# Patient Record
Sex: MALE | Race: WHITE | NOT HISPANIC OR LATINO | Employment: FULL TIME | URBAN - METROPOLITAN AREA
[De-identification: names, ages, dates, MRNs, and addresses within clinical notes are randomized per-mention and may not be internally consistent; named-entity substitution may affect disease eponyms.]

---

## 2018-01-18 NOTE — MISCELLANEOUS
Message  GI Reminder Recall Marton Halsted:   Date: 12/09/2016   Dear Nurys Beltre:     Review of our records shows you are due for the following: Follow Up Visit  Please call the following office to schedule your appointment:   [address] [place] [address], [address], [address] [place] [address]  [phone]  We look forward to hearing from you!      Sincerely,     Marina Villalta MA      Signatures   Electronically signed by : Marina Villalta, ; Dec  9 2016  9:26AM EST                       (Author)

## 2018-07-11 ENCOUNTER — TRANSCRIBE ORDERS (OUTPATIENT)
Dept: ADMINISTRATIVE | Facility: HOSPITAL | Age: 43
End: 2018-07-11

## 2018-07-11 ENCOUNTER — APPOINTMENT (OUTPATIENT)
Dept: LAB | Facility: MEDICAL CENTER | Age: 43
End: 2018-07-11
Payer: COMMERCIAL

## 2018-07-11 DIAGNOSIS — R53.83 OTHER FATIGUE: ICD-10-CM

## 2018-07-11 DIAGNOSIS — E55.9 VITAMIN D DEFICIENCY: ICD-10-CM

## 2018-07-11 DIAGNOSIS — I10 ESSENTIAL HYPERTENSION, BENIGN: ICD-10-CM

## 2018-07-11 DIAGNOSIS — I10 ESSENTIAL HYPERTENSION, BENIGN: Primary | ICD-10-CM

## 2018-07-11 DIAGNOSIS — Z79.899 ENCOUNTER FOR LONG-TERM (CURRENT) USE OF MEDICATIONS: ICD-10-CM

## 2018-07-11 DIAGNOSIS — E53.8 DEFICIENCY OF OTHER SPECIFIED B GROUP VITAMINS: ICD-10-CM

## 2018-07-11 LAB
25(OH)D3 SERPL-MCNC: 29.2 NG/ML (ref 30–100)
ALBUMIN SERPL BCP-MCNC: 4 G/DL (ref 3.5–5)
ALP SERPL-CCNC: 76 U/L (ref 46–116)
ALT SERPL W P-5'-P-CCNC: 45 U/L (ref 12–78)
ANION GAP SERPL CALCULATED.3IONS-SCNC: 5 MMOL/L (ref 4–13)
AST SERPL W P-5'-P-CCNC: 26 U/L (ref 5–45)
BACTERIA UR QL AUTO: ABNORMAL /HPF
BILIRUB SERPL-MCNC: 1.24 MG/DL (ref 0.2–1)
BILIRUB UR QL STRIP: NEGATIVE
BUN SERPL-MCNC: 21 MG/DL (ref 5–25)
CALCIUM SERPL-MCNC: 8.9 MG/DL (ref 8.3–10.1)
CHLORIDE SERPL-SCNC: 105 MMOL/L (ref 100–108)
CHOLEST SERPL-MCNC: 203 MG/DL (ref 50–200)
CLARITY UR: CLEAR
CO2 SERPL-SCNC: 27 MMOL/L (ref 21–32)
COLOR UR: ABNORMAL
CREAT SERPL-MCNC: 1.05 MG/DL (ref 0.6–1.3)
ERYTHROCYTE [DISTWIDTH] IN BLOOD BY AUTOMATED COUNT: 12.5 % (ref 11.6–15.1)
EST. AVERAGE GLUCOSE BLD GHB EST-MCNC: 108 MG/DL
GFR SERPL CREATININE-BSD FRML MDRD: 88 ML/MIN/1.73SQ M
GLUCOSE P FAST SERPL-MCNC: 84 MG/DL (ref 65–99)
GLUCOSE UR STRIP-MCNC: NEGATIVE MG/DL
HBA1C MFR BLD: 5.4 % (ref 4.2–6.3)
HCT VFR BLD AUTO: 48.2 % (ref 36.5–49.3)
HDLC SERPL-MCNC: 39 MG/DL (ref 40–60)
HGB BLD-MCNC: 15.7 G/DL (ref 12–17)
HGB UR QL STRIP.AUTO: NEGATIVE
HYALINE CASTS #/AREA URNS LPF: ABNORMAL /LPF
KETONES UR STRIP-MCNC: NEGATIVE MG/DL
LDLC SERPL CALC-MCNC: 138 MG/DL (ref 0–100)
LEUKOCYTE ESTERASE UR QL STRIP: NEGATIVE
MCH RBC QN AUTO: 29.2 PG (ref 26.8–34.3)
MCHC RBC AUTO-ENTMCNC: 32.6 G/DL (ref 31.4–37.4)
MCV RBC AUTO: 90 FL (ref 82–98)
NITRITE UR QL STRIP: NEGATIVE
NON-SQ EPI CELLS URNS QL MICRO: ABNORMAL /HPF
NONHDLC SERPL-MCNC: 164 MG/DL
PH UR STRIP.AUTO: 6 [PH] (ref 4.5–8)
PLATELET # BLD AUTO: 236 THOUSANDS/UL (ref 149–390)
PMV BLD AUTO: 10.4 FL (ref 8.9–12.7)
POTASSIUM SERPL-SCNC: 4.1 MMOL/L (ref 3.5–5.3)
PROT SERPL-MCNC: 7.6 G/DL (ref 6.4–8.2)
PROT UR STRIP-MCNC: ABNORMAL MG/DL
RBC # BLD AUTO: 5.37 MILLION/UL (ref 3.88–5.62)
RBC #/AREA URNS AUTO: ABNORMAL /HPF
SODIUM SERPL-SCNC: 137 MMOL/L (ref 136–145)
SP GR UR STRIP.AUTO: 1.03 (ref 1–1.03)
TRIGL SERPL-MCNC: 131 MG/DL
TSH SERPL DL<=0.05 MIU/L-ACNC: 1.12 UIU/ML (ref 0.36–3.74)
UROBILINOGEN UR QL STRIP.AUTO: 0.2 E.U./DL
VIT B12 SERPL-MCNC: 883 PG/ML (ref 100–900)
WBC # BLD AUTO: 6.68 THOUSAND/UL (ref 4.31–10.16)
WBC #/AREA URNS AUTO: ABNORMAL /HPF

## 2018-07-11 PROCEDURE — 36415 COLL VENOUS BLD VENIPUNCTURE: CPT | Performed by: FAMILY MEDICINE

## 2018-07-11 PROCEDURE — 80061 LIPID PANEL: CPT

## 2018-07-11 PROCEDURE — 84443 ASSAY THYROID STIM HORMONE: CPT

## 2018-07-11 PROCEDURE — 83036 HEMOGLOBIN GLYCOSYLATED A1C: CPT | Performed by: FAMILY MEDICINE

## 2018-07-11 PROCEDURE — 80053 COMPREHEN METABOLIC PANEL: CPT

## 2018-07-11 PROCEDURE — 82306 VITAMIN D 25 HYDROXY: CPT

## 2018-07-11 PROCEDURE — 84402 ASSAY OF FREE TESTOSTERONE: CPT

## 2018-07-11 PROCEDURE — 82607 VITAMIN B-12: CPT

## 2018-07-11 PROCEDURE — 84403 ASSAY OF TOTAL TESTOSTERONE: CPT

## 2018-07-11 PROCEDURE — 85027 COMPLETE CBC AUTOMATED: CPT

## 2018-07-11 PROCEDURE — 81001 URINALYSIS AUTO W/SCOPE: CPT | Performed by: FAMILY MEDICINE

## 2018-07-12 LAB
TESTOST FREE SERPL-MCNC: 11.8 PG/ML (ref 6.8–21.5)
TESTOST SERPL-MCNC: 611 NG/DL (ref 264–916)

## 2018-08-20 ENCOUNTER — TELEPHONE (OUTPATIENT)
Dept: CARDIOLOGY CLINIC | Facility: CLINIC | Age: 43
End: 2018-08-20

## 2019-07-30 ENCOUNTER — TELEPHONE (OUTPATIENT)
Dept: CARDIOLOGY CLINIC | Facility: MEDICAL CENTER | Age: 44
End: 2019-07-30

## 2019-07-30 NOTE — TELEPHONE ENCOUNTER
Called and left messages about medical records and prior cardiac history  Left marisela and agustina romero phone numbers

## 2019-09-03 ENCOUNTER — OFFICE VISIT (OUTPATIENT)
Dept: CARDIOLOGY CLINIC | Facility: MEDICAL CENTER | Age: 44
End: 2019-09-03
Payer: COMMERCIAL

## 2019-09-03 VITALS
DIASTOLIC BLOOD PRESSURE: 70 MMHG | HEART RATE: 72 BPM | BODY MASS INDEX: 29.02 KG/M2 | HEIGHT: 70 IN | OXYGEN SATURATION: 98 % | SYSTOLIC BLOOD PRESSURE: 122 MMHG | WEIGHT: 202.7 LBS

## 2019-09-03 DIAGNOSIS — R00.2 PALPITATIONS: ICD-10-CM

## 2019-09-03 DIAGNOSIS — I10 ESSENTIAL HYPERTENSION: ICD-10-CM

## 2019-09-03 DIAGNOSIS — I10 HYPERTENSION, UNSPECIFIED TYPE: Primary | ICD-10-CM

## 2019-09-03 DIAGNOSIS — E78.5 DYSLIPIDEMIA: ICD-10-CM

## 2019-09-03 PROCEDURE — 99204 OFFICE O/P NEW MOD 45 MIN: CPT | Performed by: INTERNAL MEDICINE

## 2019-09-03 PROCEDURE — 93000 ELECTROCARDIOGRAM COMPLETE: CPT | Performed by: INTERNAL MEDICINE

## 2019-09-03 PROCEDURE — 3074F SYST BP LT 130 MM HG: CPT | Performed by: INTERNAL MEDICINE

## 2019-09-03 PROCEDURE — 3078F DIAST BP <80 MM HG: CPT | Performed by: INTERNAL MEDICINE

## 2019-09-03 RX ORDER — LORATADINE 10 MG/1
10 TABLET ORAL DAILY
Refills: 0 | COMMUNITY
Start: 2019-06-02 | End: 2019-09-03 | Stop reason: ALTCHOICE

## 2019-09-03 RX ORDER — AZILSARTAN KAMEDOXOMIL 40 MG/1
40 TABLET ORAL DAILY
Refills: 1 | COMMUNITY
Start: 2019-06-17 | End: 2020-05-29

## 2019-09-03 NOTE — PROGRESS NOTES
Cardiology   Sonja Baltazar 45 y o  male MRN: [id number]        Impression:  1  Hypertension - essential   Well controlled with current medications  2  Dyslipidemia - elevated LDL  Recommendations:  1  Continue Edarbi  2  No indication for stress test at this time  3  Check fasting lipid profile  4  Recommend increasing exercise and watching diet  5  Follow up in 6 months  HPI: Sonja Baltazar is a 45y o  year old male with a history of hypertension presents for evaluation  He was diagnosed with hypertension for past 3-4 years  Without medication SBP in 180's  Prior to that, was sedentary and did not watch diet  Family history of hypertension  Feels poorly when doesn't take medications  No chest pain or shortness of breath  Review of Systems   Constitutional: Negative  HENT: Negative  Eyes: Negative  Respiratory: Negative for chest tightness and shortness of breath  Cardiovascular: Negative for chest pain, palpitations and leg swelling  Gastrointestinal: Negative  Endocrine: Negative  Genitourinary: Negative  Musculoskeletal: Negative  Skin: Negative  Allergic/Immunologic: Negative  Neurological: Negative  Hematological: Negative  Psychiatric/Behavioral: Negative  All other systems reviewed and are negative          Past Medical History:   Diagnosis Date    Hypertension      Past Surgical History:   Procedure Laterality Date    INGUINAL HERNIA REPAIR      MOUTH SURGERY      VASECTOMY       Social History     Substance and Sexual Activity   Alcohol Use Yes    Comment: social, very rare     Social History     Substance and Sexual Activity   Drug Use No     Social History     Tobacco Use   Smoking Status Never Smoker   Smokeless Tobacco Never Used     Family History   Problem Relation Age of Onset    Hypertension Mother     Arrhythmia Mother     Atrial fibrillation Mother     Hypertension Father     Stroke Father     Hyperlipidemia Neg Hx  Heart failure Neg Hx     Heart disease Neg Hx     Heart attack Neg Hx     Fainting Neg Hx     Asthma Neg Hx     Clotting disorder Neg Hx     Anemia Neg Hx        Allergies: Allergies   Allergen Reactions    Citalopram Chest Pain, Dizziness and Other (See Comments)    Penicillins        Medications:     Current Outpatient Medications:     EDARBI 40 MG tablet, Take 40 mg by mouth daily, Disp: , Rfl: 1      Wt Readings from Last 3 Encounters:   09/03/19 91 9 kg (202 lb 11 2 oz)   10/20/16 82 2 kg (181 lb 3 oz)   08/13/16 81 6 kg (180 lb)     Temp Readings from Last 3 Encounters:   10/20/16 98 1 °F (36 7 °C)   08/13/16 98 2 °F (36 8 °C) (Oral)   09/17/14 97 6 °F (36 4 °C)     BP Readings from Last 3 Encounters:   09/03/19 122/70   10/20/16 148/94   08/13/16 127/88     Pulse Readings from Last 3 Encounters:   09/03/19 72   10/20/16 70   08/13/16 58         Physical Exam   Constitutional: He is oriented to person, place, and time  He appears well-developed  HENT:   Head: Atraumatic  Eyes: EOM are normal    Neck: Normal range of motion  Cardiovascular: Normal rate, regular rhythm and normal heart sounds  Exam reveals no gallop and no friction rub  No murmur heard  Pulmonary/Chest: Effort normal and breath sounds normal  No stridor  No respiratory distress  He has no wheezes  Abdominal: Soft  Musculoskeletal: Normal range of motion  Neurological: He is alert and oriented to person, place, and time  Skin: Skin is warm and dry  Psychiatric: He has a normal mood and affect           Laboratory Studies:  CMP:  Lab Results   Component Value Date     10/11/2014    K 4 1 07/11/2018     07/11/2018    CO2 27 07/11/2018    ANIONGAP 4 10/11/2014    BUN 21 07/11/2018    CREATININE 1 05 07/11/2018    GLUCOSE 99 10/11/2014    AST 26 07/11/2018    ALT 45 07/11/2018    BILITOT 0 7 10/11/2014    EGFR 88 07/11/2018       Lipid Profile:   No results found for: CHOL  Lab Results   Component Value Date    HDL 39 (L) 07/11/2018     Lab Results   Component Value Date    LDLCALC 138 (H) 07/11/2018     Lab Results   Component Value Date    TRIG 131 07/11/2018       Cardiac testing:   EKG reviewed personally: DEBO Reaves

## 2019-09-03 NOTE — PATIENT INSTRUCTIONS
Recommendations:  1  Continue Edarbi  2  No indication for stress test at this time  3  Check fasting lipid profile  4  Recommend increasing exercise and watching diet  5  Follow up in 6 months

## 2020-05-29 DIAGNOSIS — I10 ESSENTIAL HYPERTENSION: Primary | ICD-10-CM

## 2020-05-29 RX ORDER — AZILSARTAN KAMEDOXOMIL 40 MG/1
TABLET ORAL
Qty: 90 TABLET | Refills: 1 | Status: SHIPPED | OUTPATIENT
Start: 2020-05-29 | End: 2020-08-31

## 2020-07-24 ENCOUNTER — TELEPHONE (OUTPATIENT)
Dept: FAMILY MEDICINE CLINIC | Facility: CLINIC | Age: 45
End: 2020-07-24

## 2020-07-24 NOTE — TELEPHONE ENCOUNTER
COVID Pre-Visit Screening     1  Is this a family member screening? No  2  Have you traveled outside of your state in the past 2 weeks? No  3  Do you presently have a fever or flu-like symptoms? No  4  Do you have symptoms of an upper respiratory infection like runny nose, sore throat, or cough? No  5  Are you suffering from new headache that you have not had in the past?  No  6  Do you have/have you experienced any new shortness of breath recently? No  7  Do you have any new diarrhea, nausea or vomiting? No  8  Have you been in contact with anyone who has been sick or diagnosed with COVID-19? No  9  Do you have any new loss of taste or smell? No  10  Are you able to wear a mask without a valve for the entire visit? Yes  Called pt to confirm appt and made aware of check in process    [facility]

## 2020-07-24 NOTE — PROGRESS NOTES
BMI Counseling: Body mass index is 27 26 kg/m²  The BMI is above normal  Nutrition recommendations include decreasing portion sizes, encouraging healthy choices of fruits and vegetables, decreasing fast food intake, consuming healthier snacks, limiting drinks that contain sugar, moderation in carbohydrate intake, increasing intake of lean protein, reducing intake of saturated and trans fat and reducing intake of cholesterol  Exercise recommendations include vigorous physical activity 75 minutes/week, exercising 3-5 times per week, obtaining a gym membership and strength training exercises  No pharmacotherapy was ordered  BMI Counseling: Body mass index is 27 26 kg/m²  Follow-up plan was not completed due to patient being in urgent or emergent medical situation  Assessment/Plan:         Problem List Items Addressed This Visit        Other    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    Relevant Orders    POCT urine dip (Completed)            Subjective:      Patient ID: Le Diaz is a 39 y o  male  Patient is a 53-year-old 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       The following portions of the patient's history were reviewed and updated as appropriate:   He has a past medical history of Hypertension  ,  does not have any pertinent problems on file  ,   has a past surgical history that includes Vasectomy; Mouth surgery; and Inguinal hernia repair (1995)  ,  family history includes Arrhythmia in his mother; Atrial fibrillation in his mother; COPD in his mother; Heart murmur in his mother; Hypertension in his father and mother; Stroke in his father  ,   reports that he has never smoked  He has never used smokeless tobacco  He reports that he drinks alcohol  He reports that he does not use drugs  ,  is allergic to citalopram and penicillins     Current Outpatient Medications   Medication Sig Dispense Refill    EDARBI 40 MG tablet TAKE 1 TABLET EVERY DAY 90 tablet 1     No current facility-administered medications for this visit  Review of Systems   Constitutional: Negative for appetite change, chills, fatigue and fever  HENT: Negative for congestion, ear pain, postnasal drip, rhinorrhea, sinus pain and sore throat  Eyes: Negative for pain, redness and visual disturbance  Respiratory: Negative for cough and shortness of breath  Cardiovascular: Negative for chest pain  Gastrointestinal: Negative for abdominal pain, diarrhea, nausea and vomiting  Genitourinary: Negative for dysuria and hematuria  Musculoskeletal: Negative for arthralgias  Skin: Negative  Allergic/Immunologic: Negative for environmental allergies and food allergies  Neurological: Positive for headaches  Negative for dizziness, tremors, syncope, weakness, light-headedness and numbness  Objective:  Vitals:    07/27/20 1519   BP: 146/88   BP Location: Left arm   Patient Position: Sitting   Cuff Size: Standard   Pulse: 64   Resp: 12   Temp: 98 4 °F (36 9 °C)   TempSrc: Temporal   Weight: 86 2 kg (190 lb)   Height: 5' 10" (1 778 m)     Body mass index is 27 26 kg/m²  Physical Exam   Constitutional: He is oriented to person, place, and time  He appears well-developed and well-nourished  HENT:   Head: Normocephalic and atraumatic  Eyes: Pupils are equal, round, and reactive to light  Neck: Normal range of motion  Cardiovascular: Normal rate and regular rhythm  No murmur heard  Pulmonary/Chest: Effort normal and breath sounds normal    Abdominal: Soft  Bowel sounds are normal    Musculoskeletal: Normal range of motion  Neurological: He is alert and oriented to person, place, and time  Skin: Skin is warm  Psychiatric: He has a normal mood and affect  Nursing note and vitals reviewed

## 2020-07-24 NOTE — PATIENT INSTRUCTIONS
Medical Examiner's Certificate    I certify that I have examined Vince Sloan  In accordance with the [employer] (54   60) and with knowledge of the driving duties, I find this person is qualified; and, if applicable, only when:      Qualified by operation of 49  64  The information I have provided regarding this physical examination is true and complete   A complete examination form with any attachment embodies my findings completely and correctly, and is on file in my office       _________________________________________  RASHID Zepeda  7/27/2020    __________________________________________ _______________________ _____   Signature of  s License No  State       Address of   [place] [address]  [place] [address]    Medical Certificate Expiration Date  7/27/2021

## 2020-07-27 ENCOUNTER — OFFICE VISIT (OUTPATIENT)
Dept: FAMILY MEDICINE CLINIC | Facility: CLINIC | Age: 45
End: 2020-07-27

## 2020-07-27 VITALS
RESPIRATION RATE: 12 BRPM | BODY MASS INDEX: 27.2 KG/M2 | DIASTOLIC BLOOD PRESSURE: 90 MMHG | SYSTOLIC BLOOD PRESSURE: 140 MMHG | HEIGHT: 70 IN | HEART RATE: 64 BPM | WEIGHT: 190 LBS | TEMPERATURE: 98.4 F

## 2020-07-27 DIAGNOSIS — Z02.4 ENCOUNTER FOR CDL (COMMERCIAL DRIVING LICENSE) EXAM: Primary | ICD-10-CM

## 2020-07-27 LAB
SL AMB  POCT GLUCOSE, UA: NEGATIVE
SL AMB LEUKOCYTE ESTERASE,UA: NORMAL
SL AMB POCT BILIRUBIN,UA: NEGATIVE
SL AMB POCT BLOOD,UA: NEGATIVE
SL AMB POCT CLARITY,UA: NORMAL
SL AMB POCT COLOR,UA: YELLOW
SL AMB POCT KETONES,UA: NEGATIVE
SL AMB POCT NITRITE,UA: NEGATIVE
SL AMB POCT PH,UA: 6
SL AMB POCT SPECIFIC GRAVITY,UA: 1.01
SL AMB POCT URINE PROTEIN: NORMAL
SL AMB POCT UROBILINOGEN: NEGATIVE

## 2020-07-27 PROCEDURE — 81002 URINALYSIS NONAUTO W/O SCOPE: CPT | Performed by: NURSE PRACTITIONER

## 2020-07-27 PROCEDURE — 99499 UNLISTED E&M SERVICE: CPT | Performed by: NURSE PRACTITIONER

## 2020-07-27 PROCEDURE — 3080F DIAST BP >= 90 MM HG: CPT | Performed by: NURSE PRACTITIONER

## 2020-07-27 PROCEDURE — 3077F SYST BP >= 140 MM HG: CPT | Performed by: NURSE PRACTITIONER

## 2020-07-27 PROCEDURE — 3008F BODY MASS INDEX DOCD: CPT | Performed by: NURSE PRACTITIONER

## 2020-08-30 DIAGNOSIS — I10 ESSENTIAL HYPERTENSION: ICD-10-CM

## 2020-08-31 RX ORDER — AZILSARTAN KAMEDOXOMIL 40 MG/1
TABLET ORAL
Qty: 90 TABLET | Refills: 1 | Status: SHIPPED | OUTPATIENT
Start: 2020-08-31

## 2024-02-21 PROBLEM — Z02.4 ENCOUNTER FOR CDL (COMMERCIAL DRIVING LICENSE) EXAM: Status: RESOLVED | Noted: 2020-07-27 | Resolved: 2024-02-21

## 2024-05-30 ENCOUNTER — TELEPHONE (OUTPATIENT)
Dept: GASTROENTEROLOGY | Facility: CLINIC | Age: 49
End: 2024-05-30

## 2024-05-30 NOTE — TELEPHONE ENCOUNTER
Left message for patient to contact the office with insurance information to update chart or will be a $30 deposit on day of appointment

## 2024-05-31 ENCOUNTER — OFFICE VISIT (OUTPATIENT)
Dept: GASTROENTEROLOGY | Facility: CLINIC | Age: 49
End: 2024-05-31
Payer: COMMERCIAL

## 2024-05-31 VITALS
WEIGHT: 166 LBS | HEIGHT: 70 IN | SYSTOLIC BLOOD PRESSURE: 152 MMHG | HEART RATE: 72 BPM | TEMPERATURE: 97.5 F | OXYGEN SATURATION: 99 % | DIASTOLIC BLOOD PRESSURE: 89 MMHG | BODY MASS INDEX: 23.77 KG/M2

## 2024-05-31 DIAGNOSIS — R10.13 EPIGASTRIC PAIN: ICD-10-CM

## 2024-05-31 DIAGNOSIS — K64.1 GRADE II HEMORRHOIDS: ICD-10-CM

## 2024-05-31 DIAGNOSIS — R14.2 BURPING: ICD-10-CM

## 2024-05-31 DIAGNOSIS — K62.5 RECTAL BLEEDING: Primary | ICD-10-CM

## 2024-05-31 PROCEDURE — 99214 OFFICE O/P EST MOD 30 MIN: CPT | Performed by: INTERNAL MEDICINE

## 2024-05-31 RX ORDER — HYDROCORTISONE ACETATE 25 MG/1
25 SUPPOSITORY RECTAL
Qty: 14 SUPPOSITORY | Refills: 2 | Status: SHIPPED | OUTPATIENT
Start: 2024-05-31

## 2024-05-31 NOTE — PROGRESS NOTES
[facility] - Outpatient Consultation  El Marti 48 y.o. male MRN: [id number]  Encounter: [id number]          ASSESSMENT AND PLAN:       Rectal bleeding  - hydrocortisone (ANUSOL-HC) 25 mg suppository; Insert 1 suppository (25 mg total) into the rectum daily at bedtime  Dispense: 14 suppository; Refill: 2  - Colonoscopy; Future    2. Grade II hemorrhoids  - hydrocortisone (ANUSOL-HC) 25 mg suppository; Insert 1 suppository (25 mg total) into the rectum daily at bedtime  Dispense: 14 suppository; Refill: 2    3. Epigastric pain  - EGD; Future    4. Burping  - EGD; Future    5.  Gastroesophageal reflux disease    ______________________________________________________________________    HPI: El is a 48-year-old male who comes the office today for evaluation of multiple GI complaints.  He has been experiencing intermittent rectal bleeding intermittently over the past couple of months.  He has a longstanding history of having hemorrhoids over the past 5 to 7 years.  He states that the bleeding is of bright red rectal bleeding.  His last colonoscopy was performed 6 years ago and at that time he was reported to have hemorrhoids.  There is no family history for colon cancer for colon polyp.  Patient denies any nausea, vomiting, constipation but he does admit to diarrhea on occasion.  He states he has a diagnosis of irritable bowel syndrome.  He denies any gasping, bloating but he does admit to burping frequently.  He has a longstanding history of intermittent gastroesophageal reflux disease.  In addition to experiencing indigestion he has an upper abdominal pressure sensation.  There is no family history for colon cancer for colon polyp.  He is experiencing abdominal pains on occasion as well as burping, heartburn, diarrhea which is frequent in the morning.  She is currently not on any medications.  He has a prior history of irritable bowel syndrome.  He admits to excessive burping  but denies any regurgitation.  He is experiencing epigastric discomfort as well as indigestion on a fairly frequent basis.      REVIEW OF SYSTEMS:    CONSTITUTIONAL: Denies any fever, chills, rigors, and weight loss.  HEENT: No earache or tinnitus. Denies hearing loss or visual disturbances.  CARDIOVASCULAR: No chest pain or palpitations.   RESPIRATORY: Denies any cough, hemoptysis, shortness of breath or dyspnea on exertion.  GASTROINTESTINAL: As noted in the History of Present Illness.   GENITOURINARY: No problems with urination. Denies any hematuria or dysuria.  NEUROLOGIC: No dizziness or vertigo, denies headaches.   MUSCULOSKELETAL: Denies any muscle or joint pain.   SKIN: Denies skin rashes or itching.   ENDOCRINE: Denies excessive thirst. Denies intolerance to heat or cold.  PSYCHOSOCIAL: Denies depression or anxiety. Denies any recent memory loss.       Historical Information   Past Medical History:   Diagnosis Date    Hypertension      Past Surgical History:   Procedure Laterality Date    INGUINAL HERNIA REPAIR  1995    Prp I/corey init reduc >5 yr     MOUTH SURGERY      VASECTOMY       Social History   Social History     Substance and Sexual Activity   Alcohol Use Yes    Comment: social, very rare     Social History     Substance and Sexual Activity   Drug Use No     Social History     Tobacco Use   Smoking Status Never   Smokeless Tobacco Never     Family History   Problem Relation Age of Onset    Hypertension Mother     Arrhythmia Mother     Atrial fibrillation Mother     Heart murmur Mother     COPD Mother     Hypertension Father     Stroke Father     Hyperlipidemia Neg Hx     Heart failure Neg Hx     Heart disease Neg Hx     Heart attack Neg Hx     Fainting Neg Hx     Asthma Neg Hx     Clotting disorder Neg Hx     Anemia Neg Hx        Meds/Allergies       Current Outpatient Medications:     hydrocortisone (ANUSOL-HC) 25 mg suppository    Edarbi 40 MG tablet    Allergies   Allergen Reactions     "Citalopram Chest Pain, Dizziness and Other (See Comments)    Penicillins            Objective     Blood pressure 152/89, pulse 72, temperature 97.5 °F (36.4 °C), temperature source Tympanic, height 5' 10\" (1.778 m), weight 75.3 kg (166 lb), SpO2 99%. Body mass index is 23.82 kg/m².        PHYSICAL EXAM:      General Appearance:   Alert, cooperative, no distress   HEENT:   Normocephalic, atraumatic, anicteric.     Neck:  Supple, symmetrical, trachea midline   Lungs:   Clear to auscultation bilaterally; no rales, rhonchi or wheezing; respirations unlabored    Heart::   Regular rate and rhythm; no murmur, rub, or gallop.   Abdomen:   Soft, non-tender, non-distended; normal bowel sounds; no masses, no organomegaly    Genitalia:   Deferred    Rectal:   Deferred    Extremities:  No cyanosis, clubbing or edema    Pulses:  2+ and symmetric    Skin:  No jaundice, rashes, or lesions    Lymph nodes:  No palpable cervical lymphadenopathy        Lab Results:   No visits with results within 1 Day(s) from this visit.   Latest known visit with results is:   Office Visit on 07/27/2020   Component Date Value    LEUKOCYTE ESTERASE,UA 07/27/2020 trace     NITRITE,UA 07/27/2020 negative     SL AMB POCT UROBILINOGEN 07/27/2020 negative     POCT URINE PROTEIN 07/27/2020 trace      PH,UA 07/27/2020 6     BLOOD,UA 07/27/2020 negative     SPECIFIC GRAVITY,UA 07/27/2020 1.015     KETONES,UA 07/27/2020 negative     BILIRUBIN,UA 07/27/2020 negative     GLUCOSE, UA 07/27/2020 negative      COLOR,UA 07/27/2020 yellow     CLARITY,UA 07/27/2020 normal          Radiology Results:   No results found.  "

## 2024-05-31 NOTE — PATIENT INSTRUCTIONS
Scheduled date of EGD/colonoscopy (as of today):6/13/24  Physician performing EGD/colonoscopy:Stefania  Location of EGD/colonoscopy:[facility]  Desired bowel prep reviewed with patient:Margarita/Miralax  Instructions reviewed with patient by:Arjun trevizo   Clearances:   none

## 2024-06-11 DIAGNOSIS — K62.5 RECTAL BLEEDING: ICD-10-CM

## 2024-06-11 DIAGNOSIS — K64.1 GRADE II HEMORRHOIDS: ICD-10-CM

## 2024-06-11 RX ORDER — HYDROCORTISONE ACETATE 25 MG/1
25 SUPPOSITORY RECTAL
Qty: 14 SUPPOSITORY | Refills: 2 | Status: SHIPPED | OUTPATIENT
Start: 2024-06-11

## 2024-07-12 DIAGNOSIS — K64.1 GRADE II HEMORRHOIDS: ICD-10-CM

## 2024-07-12 DIAGNOSIS — K62.5 RECTAL BLEEDING: ICD-10-CM

## 2024-07-12 RX ORDER — HYDROCORTISONE ACETATE 25 MG/1
25 SUPPOSITORY RECTAL
Qty: 14 SUPPOSITORY | Refills: 2 | Status: SHIPPED | OUTPATIENT
Start: 2024-07-12

## 2024-08-20 DIAGNOSIS — K62.5 RECTAL BLEEDING: ICD-10-CM

## 2024-08-20 DIAGNOSIS — K64.1 GRADE II HEMORRHOIDS: ICD-10-CM

## 2024-08-20 RX ORDER — HYDROCORTISONE ACETATE 25 MG/1
25 SUPPOSITORY RECTAL
Qty: 14 SUPPOSITORY | Refills: 2 | Status: SHIPPED | OUTPATIENT
Start: 2024-08-20

## 2024-08-27 ENCOUNTER — TREATMENT (OUTPATIENT)
Dept: GASTROENTEROLOGY | Facility: CLINIC | Age: 49
End: 2024-08-27

## 2024-08-27 ENCOUNTER — ANESTHESIA (OUTPATIENT)
Dept: GASTROENTEROLOGY | Facility: HOSPITAL | Age: 49
End: 2024-08-27

## 2024-08-27 ENCOUNTER — HOSPITAL ENCOUNTER (OUTPATIENT)
Dept: GASTROENTEROLOGY | Facility: HOSPITAL | Age: 49
Setting detail: OUTPATIENT SURGERY
Discharge: HOME | End: 2024-08-27
Attending: INTERNAL MEDICINE | Admitting: INTERNAL MEDICINE
Payer: COMMERCIAL

## 2024-08-27 ENCOUNTER — ANESTHESIA EVENT (OUTPATIENT)
Dept: GASTROENTEROLOGY | Facility: HOSPITAL | Age: 49
End: 2024-08-27

## 2024-08-27 VITALS
SYSTOLIC BLOOD PRESSURE: 123 MMHG | RESPIRATION RATE: 20 BRPM | OXYGEN SATURATION: 96 % | HEART RATE: 77 BPM | HEIGHT: 70 IN | WEIGHT: 161.38 LBS | DIASTOLIC BLOOD PRESSURE: 77 MMHG | TEMPERATURE: 97.6 F | BODY MASS INDEX: 23.1 KG/M2

## 2024-08-27 DIAGNOSIS — K62.5 RECTAL BLEEDING: ICD-10-CM

## 2024-08-27 DIAGNOSIS — K64.1 GRADE II HEMORRHOIDS: Primary | ICD-10-CM

## 2024-08-27 PROCEDURE — 45378 DIAGNOSTIC COLONOSCOPY: CPT | Performed by: INTERNAL MEDICINE

## 2024-08-27 RX ORDER — PROPOFOL 10 MG/ML
INJECTION, EMULSION INTRAVENOUS AS NEEDED
Status: DISCONTINUED | OUTPATIENT
Start: 2024-08-27 | End: 2024-08-27

## 2024-08-27 RX ORDER — SODIUM CHLORIDE, SODIUM LACTATE, POTASSIUM CHLORIDE, CALCIUM CHLORIDE 600; 310; 30; 20 MG/100ML; MG/100ML; MG/100ML; MG/100ML
INJECTION, SOLUTION INTRAVENOUS CONTINUOUS PRN
Status: DISCONTINUED | OUTPATIENT
Start: 2024-08-27 | End: 2024-08-27

## 2024-08-27 RX ORDER — HYDROCORTISONE ACETATE 25 MG/1
25 SUPPOSITORY RECTAL 2 TIMES DAILY
Qty: 16 SUPPOSITORY | Refills: 1 | Status: SHIPPED | OUTPATIENT
Start: 2024-08-27

## 2024-08-27 RX ADMIN — PROPOFOL 50 MG: 10 INJECTION, EMULSION INTRAVENOUS at 11:50

## 2024-08-27 RX ADMIN — PROPOFOL 50 MG: 10 INJECTION, EMULSION INTRAVENOUS at 11:52

## 2024-08-27 RX ADMIN — PROPOFOL 50 MG: 10 INJECTION, EMULSION INTRAVENOUS at 11:51

## 2024-08-27 RX ADMIN — SODIUM CHLORIDE, SODIUM LACTATE, POTASSIUM CHLORIDE, AND CALCIUM CHLORIDE: .6; .31; .03; .02 INJECTION, SOLUTION INTRAVENOUS at 11:39

## 2024-08-27 RX ADMIN — PROPOFOL 100 MG: 10 INJECTION, EMULSION INTRAVENOUS at 11:55

## 2024-08-27 RX ADMIN — PROPOFOL 50 MG: 10 INJECTION, EMULSION INTRAVENOUS at 11:58

## 2024-08-27 RX ADMIN — PROPOFOL 150 MG: 10 INJECTION, EMULSION INTRAVENOUS at 11:49

## 2024-08-27 NOTE — ANESTHESIA POSTPROCEDURE EVALUATION
Post-Op Assessment Note    CV Status:  Stable    Pain management: adequate       Mental Status:  Sleepy   Hydration Status:  Euvolemic   PONV Controlled:  Controlled   Airway Patency:  Patent  Two or more mitigation strategies used for obstructive sleep apnea   Post Op Vitals Reviewed: Yes    No anethesia notable event occurred.    Staff: Anesthesiologist, CRNA               /71 (08/27/24 1205)    Temp 97.6 °F (36.4 °C) (08/27/24 1205)    Pulse 76 (08/27/24 1205)   Resp 14 (08/27/24 1205)    SpO2 94 % (08/27/24 1205)

## 2024-08-27 NOTE — H&P
"History and Physical -  [facility]  El Marti 49 y.o. male MRN: [id number]      HPI: El Marti is a 49 y.o. year old male who presents for rectal bleeding      REVIEW OF SYSTEMS: Per the HPI, and otherwise unremarkable.    Historical Information   Past Medical History:   Diagnosis Date    Hypertension      Past Surgical History:   Procedure Laterality Date    INGUINAL HERNIA REPAIR  1995    Prp I/corey init reduc >5 yr     MOUTH SURGERY      VASECTOMY       Social History   Social History     Substance and Sexual Activity   Alcohol Use Yes    Comment: social, very rare     Social History     Substance and Sexual Activity   Drug Use No     Social History     Tobacco Use   Smoking Status Never   Smokeless Tobacco Never     Family History   Problem Relation Age of Onset    Hypertension Mother     Arrhythmia Mother     Atrial fibrillation Mother     Heart murmur Mother     COPD Mother     Hypertension Father     Stroke Father     Hyperlipidemia Neg Hx     Heart failure Neg Hx     Heart disease Neg Hx     Heart attack Neg Hx     Fainting Neg Hx     Asthma Neg Hx     Clotting disorder Neg Hx     Anemia Neg Hx        Meds/Allergies     Not in a hospital admission.    Allergies   Allergen Reactions    Citalopram Chest Pain, Dizziness and Other (See Comments)    Penicillins        Objective     Blood pressure 136/93, pulse 69, temperature 98 °F (36.7 °C), temperature source Temporal, resp. rate 16, height 5' 10\" (1.778 m), weight 73.2 kg (161 lb 6 oz), SpO2 98%.      PHYSICAL EXAM    Gen: NAD  CV: RRR  CHEST: Clear  ABD: soft, NT/ND  EXT: no edema      ASSESSMENT/PLAN:  This is a 49 y.o. year old male here for colonoscopy, and he is stable and optimized for his procedure.          "

## 2024-08-27 NOTE — ANESTHESIA PREPROCEDURE EVALUATION
Procedure:  COLONOSCOPY    Relevant Problems   CARDIO   (+) Essential hypertension        Physical Exam    Airway    Mallampati score: III  TM Distance: >3 FB  Neck ROM: full     Dental   No notable dental hx     Cardiovascular  Cardiovascular exam normal    Pulmonary  Pulmonary exam normal     Other Findings        Anesthesia Plan  ASA Score- 2     Anesthesia Type- IV sedation with anesthesia with ASA Monitors.         Additional Monitors:     Airway Plan:            Plan Factors-Exercise tolerance (METS): >4 METS.    Chart reviewed.    Patient summary reviewed.    Patient is not a current smoker.              Induction- intravenous.    Postoperative Plan-         Informed Consent- Anesthetic plan and risks discussed with patient.

## 2024-10-02 DIAGNOSIS — K64.1 GRADE II HEMORRHOIDS: ICD-10-CM

## 2024-10-02 DIAGNOSIS — K62.5 RECTAL BLEEDING: ICD-10-CM

## 2024-10-02 RX ORDER — HYDROCORTISONE ACETATE 25 MG/1
25 SUPPOSITORY RECTAL
Qty: 14 SUPPOSITORY | Refills: 2 | Status: SHIPPED | OUTPATIENT
Start: 2024-10-02

## 2024-10-22 ENCOUNTER — OFFICE VISIT (OUTPATIENT)
Dept: FAMILY MEDICINE CLINIC | Facility: CLINIC | Age: 49
End: 2024-10-22
Payer: COMMERCIAL

## 2024-10-22 VITALS
BODY MASS INDEX: 23.51 KG/M2 | HEART RATE: 63 BPM | HEIGHT: 70 IN | DIASTOLIC BLOOD PRESSURE: 100 MMHG | OXYGEN SATURATION: 99 % | WEIGHT: 164.2 LBS | SYSTOLIC BLOOD PRESSURE: 150 MMHG | TEMPERATURE: 97.7 F

## 2024-10-22 DIAGNOSIS — R63.4 WEIGHT LOSS, ABNORMAL: ICD-10-CM

## 2024-10-22 DIAGNOSIS — R10.13 EPIGASTRIC PAIN: Primary | ICD-10-CM

## 2024-10-22 DIAGNOSIS — M54.2 NECK PAIN: ICD-10-CM

## 2024-10-22 DIAGNOSIS — R60.9 DEPENDENT EDEMA: ICD-10-CM

## 2024-10-22 DIAGNOSIS — F33.2 SEVERE EPISODE OF RECURRENT MAJOR DEPRESSIVE DISORDER, WITHOUT PSYCHOTIC FEATURES (HCC): ICD-10-CM

## 2024-10-22 DIAGNOSIS — R42 LIGHTHEADEDNESS: ICD-10-CM

## 2024-10-22 PROCEDURE — 99204 OFFICE O/P NEW MOD 45 MIN: CPT | Performed by: FAMILY MEDICINE

## 2024-10-22 PROCEDURE — 93000 ELECTROCARDIOGRAM COMPLETE: CPT | Performed by: FAMILY MEDICINE

## 2024-10-22 RX ORDER — OMEPRAZOLE 40 MG/1
40 CAPSULE, DELAYED RELEASE ORAL DAILY
Qty: 30 CAPSULE | Refills: 1 | Status: SHIPPED | OUTPATIENT
Start: 2024-10-22

## 2024-10-22 NOTE — PROGRESS NOTES
Ambulatory Visit  Name: El Marti      : 1975      MRN: [id number]  Encounter Provider: Jeremy Acharya MD  Encounter Date: 10/22/2024   Encounter department: [facility]    Assessment & Plan  Epigastric pain  I reviewed with pt. Seems to be long standing, but worse recently?  Unclear if it is related to weight loss. Check labs and UGI. Start omeprazole as directed.  Start GERD diet.  Recheck 2-3w  Orders:    CBC and differential; Future    Comprehensive metabolic panel; Future    Lipase; Future    omeprazole (PriLOSEC) 40 MG capsule; Take 1 capsule (40 mg total) by mouth daily    FL UPPER GI UGI; Future    Weight loss, abnormal  Unclear cause.  May be related to poor oral intake due to dental issues though cannot r/o other causes.  Check labs and CXR.  Recheck 2-3w - consider CT of abd/pelvis if weight loss persists.   Orders:    CBC and differential; Future    Comprehensive metabolic panel; Future    TSH, 3rd generation with Free T4 reflex; Future    Lipase; Future    XR chest pa and lateral; Future    Neck pain  Chronic.  TTP over the mid paracervical muscles.   Orders:    XR spine cervical complete 4 or 5 vw non injury; Future    Dependent edema  ?cause. No ambrocio or JVD appreciated on exam. ECG unremarkable. Check labs. Given long hx of poorly controlled HTN, would consider echo to r/o hypertensive cardiomyopathy (though ECG was normal). Recheck 2-3w  Orders:    POCT ECG    Lightheadedness  Unclear cause. ECG unremarkable.  Check labs. Recheck 2-3w    Orders:    POCT ECG    Severe episode of recurrent major depressive disorder, without psychotic features (HCC)  Depression Screening Follow-up Plan: Patient's depression screening was positive with a PHQ-2 score of 6. Their PHQ-9 score was 20.  I reviewed with pt. Discussed treatment options. Pt feels that a lot of his mood is related to his ongoing medical issues and concerns.  Will check labs and studies. Watch for  "now.  Recheck2-3w .  Consider meds if not improving              History of Present Illness     Pt here to be established  -Patient with 3-year history of intermittent fatigue.  He explains that this occurs just about every day.  He does not sleep very well, averaging 4 to 5 hours a night.  He does not feel well rested when he wakes up in the morning.  He can have periods of energy but within half an hour will feel fatigued like he could take a nap.  Patient states he has always had some difficulty with sleep but most of these fatigue symptoms started after having COVID in 2021.  -Patient also has a long history of GI issues.  This has been associated with some weight loss though this may be multifactorial.  Patient states that he weighed around 208 pounds back in 2019 but started changing his diet in an effort to lose weight.  Patient also lost his teeth around the same time and has been working on getting implants.  He notes now that he goes from being ravenous to uninterested in food.  Frequently, after eating, he will get some epigastric discomfort within 10 minutes of finishing.  This discomfort is vague but not particularly pain.  Typically resolves after approximately 30 minutes without sequela.  He denies any changes in bowel habits.  Patient did recently have a colonoscopy that was unremarkable.  Patient has not tried any counter medication for this.  -Patient has a history of intermittent dependent edema.  He was seen in the emergency room while living in [place] for this.  At that time, venous duplex as well as EKG was unremarkable.  Did not seem like he had any other workup.  Symptoms seem to come and go at this time.  -Patient does have a history of hypertension.  He was treated approximately 15 years ago with losartan but felt fatigued and \"blocked\" on it as well as several other side effects.  Since then, patient has been reluctant to retry any other blood pressure medicines.  He denies any chest pain " "but does get intermittent palpitations.  Patient also can get mild lightheadedness with exertion but no other cardiovascular symptoms.  -Patient with chronic history of right-sided neck pain.  Started when he was around 10 years old-injured his neck when \"goofing around\" with a friend.  Since then, patient's had persistent pain and tightness in the right side of his neck that is slowly gotten worse that he has gotten older.  There is no radiation to his arms and he denies any hand or arm weakness.  - I reviewed PMHx, PSHx, Fam Hx and Soc Hx with pt.   - full ROS done        Review of Systems   Constitutional:  Positive for activity change, appetite change, fatigue and unexpected weight change. Negative for chills, diaphoresis and fever.   HENT: Negative.     Eyes: Negative.    Respiratory: Negative.     Cardiovascular: Negative.    Gastrointestinal:  Positive for abdominal pain and nausea. Negative for abdominal distention, blood in stool, constipation, diarrhea and vomiting.   Genitourinary: Negative.    Musculoskeletal:  Positive for arthralgias, myalgias, neck pain and neck stiffness. Negative for back pain and gait problem.   Skin: Negative.    Neurological:  Negative for dizziness, weakness, light-headedness, numbness and headaches.   Psychiatric/Behavioral:  Positive for dysphoric mood and sleep disturbance. Negative for decreased concentration, self-injury and suicidal ideas. The patient is nervous/anxious.      Past Medical History:   Diagnosis Date    Hypertension      Past Surgical History:   Procedure Laterality Date    INGUINAL HERNIA REPAIR  1995    Prp I/corey init reduc >5 yr     MOUTH SURGERY      VASECTOMY       Family History   Problem Relation Age of Onset    Hypertension Mother     Arrhythmia Mother     Atrial fibrillation Mother     Heart murmur Mother     COPD Mother     Cancer Father     Heart disease Father     Hypertension Father     Stroke Father     Hyperlipidemia Neg Hx     Heart failure " "Neg Hx     Heart attack Neg Hx     Fainting Neg Hx     Asthma Neg Hx     Clotting disorder Neg Hx     Anemia Neg Hx      Social History     Tobacco Use    Smoking status: Former     Types: Cigarettes    Smokeless tobacco: Never   Vaping Use    Vaping status: Never Used   Substance and Sexual Activity    Alcohol use: Yes     Comment: social, very rare    Drug use: No    Sexual activity: Yes     No current outpatient medications on file prior to visit.     Allergies   Allergen Reactions    Citalopram Chest Pain, Dizziness and Other (See Comments)    Penicillins        There is no immunization history on file for this patient.  Objective     /100 (BP Location: Left arm, Patient Position: Sitting, Cuff Size: Adult)   Pulse 63   Temp 97.7 °F (36.5 °C)   Ht 5' 10\" (1.778 m)   Wt 74.5 kg (164 lb 3.2 oz)   SpO2 99%   BMI 23.56 kg/m²     Physical Exam  Vitals reviewed.   Constitutional:       Appearance: Normal appearance.   HENT:      Head: Normocephalic.      Right Ear: Tympanic membrane, ear canal and external ear normal.      Left Ear: Tympanic membrane, ear canal and external ear normal.      Nose: Nose normal.      Mouth/Throat:      Mouth: Mucous membranes are moist.   Eyes:      General: No scleral icterus.     Extraocular Movements: Extraocular movements intact.      Conjunctiva/sclera: Conjunctivae normal.      Pupils: Pupils are equal, round, and reactive to light.   Neck:      Vascular: No carotid bruit.   Cardiovascular:      Rate and Rhythm: Normal rate and regular rhythm.      Pulses: Normal pulses.   Pulmonary:      Effort: Pulmonary effort is normal.      Breath sounds: No wheezing or rales.   Abdominal:      General: There is no distension.      Palpations: There is no mass.      Tenderness: There is no abdominal tenderness. There is no right CVA tenderness or left CVA tenderness.   Musculoskeletal:         General: Tenderness (paracervical muscles) present. No swelling or deformity. Normal " range of motion.      Cervical back: Tenderness (mild paracervical) present.      Right lower leg: Edema (trace-1+) present.      Left lower leg: Edema (trace-1+) present.   Lymphadenopathy:      Head:      Right side of head: No submental, submandibular, tonsillar, preauricular, posterior auricular or occipital adenopathy.      Left side of head: No submental, submandibular, tonsillar, preauricular, posterior auricular or occipital adenopathy.      Cervical: No cervical adenopathy.      Right cervical: No superficial, deep or posterior cervical adenopathy.     Left cervical: No superficial, deep or posterior cervical adenopathy.      Upper Body:      Right upper body: No supraclavicular, axillary, pectoral or epitrochlear adenopathy.      Left upper body: No supraclavicular, axillary, pectoral or epitrochlear adenopathy.      Lower Body: No right inguinal adenopathy. No left inguinal adenopathy.   Skin:     General: Skin is warm.      Capillary Refill: Capillary refill takes less than 2 seconds.   Neurological:      General: No focal deficit present.      Mental Status: He is alert and oriented to person, place, and time.   Psychiatric:         Behavior: Behavior normal.         Thought Content: Thought content normal.         Judgment: Judgment normal.      Comments: PHQ-2/9 Depression Screening    Little interest or pleasure in doing things: 3 - nearly every day  Feeling down, depressed, or hopeless: 3 - nearly every day  Trouble falling or staying asleep, or sleeping too much: 3 - nearly every   day  Feeling tired or having little energy: 2 - more than half the days  Poor appetite or overeating: 3 - nearly every day  Feeling bad about yourself - or that you are a failure or have let   yourself or your family down: 3 - nearly every day  Trouble concentrating on things, such as reading the newspaper or watching   television: 3 - nearly every day  Moving or speaking so slowly that other people could have noticed.  Or the   opposite - being so fidgety or restless that you have been moving around a   lot more than usual: 0 - not at all  Thoughts that you would be better off dead, or of hurting yourself in some   way: 0 - not at all  PHQ-2 Score: 6  PHQ-2 Interpretation: POSITIVE depression screen  PHQ-9 Score: 20  PHQ-9 Interpretation: Severe depression

## 2024-10-23 ENCOUNTER — APPOINTMENT (OUTPATIENT)
Dept: LAB | Facility: MEDICAL CENTER | Age: 49
End: 2024-10-23
Payer: COMMERCIAL

## 2024-10-23 DIAGNOSIS — R10.13 EPIGASTRIC PAIN: ICD-10-CM

## 2024-10-23 DIAGNOSIS — R63.4 WEIGHT LOSS, ABNORMAL: ICD-10-CM

## 2024-10-23 LAB
ALBUMIN SERPL BCG-MCNC: 4.5 G/DL (ref 3.5–5)
ALP SERPL-CCNC: 64 U/L (ref 34–104)
ALT SERPL W P-5'-P-CCNC: 19 U/L (ref 7–52)
ANION GAP SERPL CALCULATED.3IONS-SCNC: 8 MMOL/L (ref 4–13)
AST SERPL W P-5'-P-CCNC: 18 U/L (ref 13–39)
BASOPHILS # BLD AUTO: 0.06 THOUSANDS/ΜL (ref 0–0.1)
BASOPHILS NFR BLD AUTO: 1 % (ref 0–1)
BILIRUB SERPL-MCNC: 0.86 MG/DL (ref 0.2–1)
BUN SERPL-MCNC: 14 MG/DL (ref 5–25)
CALCIUM SERPL-MCNC: 9.4 MG/DL (ref 8.4–10.2)
CHLORIDE SERPL-SCNC: 105 MMOL/L (ref 96–108)
CO2 SERPL-SCNC: 29 MMOL/L (ref 21–32)
CREAT SERPL-MCNC: 0.97 MG/DL (ref 0.6–1.3)
EOSINOPHIL # BLD AUTO: 0.29 THOUSAND/ΜL (ref 0–0.61)
EOSINOPHIL NFR BLD AUTO: 4 % (ref 0–6)
ERYTHROCYTE [DISTWIDTH] IN BLOOD BY AUTOMATED COUNT: 12.4 % (ref 11.6–15.1)
GFR SERPL CREATININE-BSD FRML MDRD: 92 ML/MIN/1.73SQ M
GLUCOSE SERPL-MCNC: 89 MG/DL (ref 65–140)
HCT VFR BLD AUTO: 45.6 % (ref 36.5–49.3)
HGB BLD-MCNC: 15.1 G/DL (ref 12–17)
IMM GRANULOCYTES # BLD AUTO: 0.01 THOUSAND/UL (ref 0–0.2)
IMM GRANULOCYTES NFR BLD AUTO: 0 % (ref 0–2)
LIPASE SERPL-CCNC: 25 U/L (ref 11–82)
LYMPHOCYTES # BLD AUTO: 2.7 THOUSANDS/ΜL (ref 0.6–4.47)
LYMPHOCYTES NFR BLD AUTO: 35 % (ref 14–44)
MCH RBC QN AUTO: 29.9 PG (ref 26.8–34.3)
MCHC RBC AUTO-ENTMCNC: 33.1 G/DL (ref 31.4–37.4)
MCV RBC AUTO: 90 FL (ref 82–98)
MONOCYTES # BLD AUTO: 0.76 THOUSAND/ΜL (ref 0.17–1.22)
MONOCYTES NFR BLD AUTO: 10 % (ref 4–12)
NEUTROPHILS # BLD AUTO: 3.84 THOUSANDS/ΜL (ref 1.85–7.62)
NEUTS SEG NFR BLD AUTO: 50 % (ref 43–75)
NRBC BLD AUTO-RTO: 0 /100 WBCS
PLATELET # BLD AUTO: 225 THOUSANDS/UL (ref 149–390)
PMV BLD AUTO: 11.3 FL (ref 8.9–12.7)
POTASSIUM SERPL-SCNC: 4.6 MMOL/L (ref 3.5–5.3)
PROT SERPL-MCNC: 6.8 G/DL (ref 6.4–8.4)
RBC # BLD AUTO: 5.05 MILLION/UL (ref 3.88–5.62)
SODIUM SERPL-SCNC: 142 MMOL/L (ref 135–147)
TSH SERPL DL<=0.05 MIU/L-ACNC: 1.98 UIU/ML (ref 0.45–4.5)
WBC # BLD AUTO: 7.66 THOUSAND/UL (ref 4.31–10.16)

## 2024-10-23 PROCEDURE — 84443 ASSAY THYROID STIM HORMONE: CPT

## 2024-10-23 PROCEDURE — 83690 ASSAY OF LIPASE: CPT

## 2024-10-23 PROCEDURE — 80053 COMPREHEN METABOLIC PANEL: CPT

## 2024-10-23 PROCEDURE — 36415 COLL VENOUS BLD VENIPUNCTURE: CPT

## 2024-10-23 PROCEDURE — 85025 COMPLETE CBC W/AUTO DIFF WBC: CPT

## 2024-10-24 PROBLEM — F33.2 SEVERE EPISODE OF RECURRENT MAJOR DEPRESSIVE DISORDER, WITHOUT PSYCHOTIC FEATURES (HCC): Status: ACTIVE | Noted: 2024-10-24

## 2024-10-24 NOTE — ASSESSMENT & PLAN NOTE
Depression Screening Follow-up Plan: Patient's depression screening was positive with a PHQ-2 score of 6. Their PHQ-9 score was 20.  I reviewed with pt. Discussed treatment options. Pt feels that a lot of his mood is related to his ongoing medical issues and concerns.  Will check labs and studies. Watch for now.  Recheck2-3w .  Consider meds if not improving

## 2024-11-01 ENCOUNTER — HOSPITAL ENCOUNTER (OUTPATIENT)
Dept: RADIOLOGY | Facility: HOSPITAL | Age: 49
Discharge: HOME | End: 2024-11-01
Attending: FAMILY MEDICINE
Payer: COMMERCIAL

## 2024-11-01 DIAGNOSIS — R10.13 EPIGASTRIC PAIN: ICD-10-CM

## 2024-11-01 PROCEDURE — 74240 X-RAY XM UPR GI TRC 1CNTRST: CPT

## 2024-11-04 ENCOUNTER — TELEPHONE (OUTPATIENT)
Dept: FAMILY MEDICINE CLINIC | Facility: CLINIC | Age: 49
End: 2024-11-04

## 2024-11-04 NOTE — TELEPHONE ENCOUNTER
----- Message from Jeremy Acharya MD sent at 11/4/2024  7:36 AM EST -----  UGI was normal - let pt know.  How is his abd?  Still having pain?

## 2024-11-05 ENCOUNTER — TELEPHONE (OUTPATIENT)
Age: 49
End: 2024-11-05

## 2024-11-05 NOTE — TELEPHONE ENCOUNTER
Wife Magi (consent obtained from patient) calling regarding patients hemorrhoids.  During colonoscopy had given treatment which patient has been doing with no relief.  Hemorrhoids bleeding and worsened since then.  Had discussed surgical options.  Would like referral to [facility] so can make appointment.

## 2024-11-05 NOTE — TELEPHONE ENCOUNTER
Has an appointment here tomorrow for a follow up visit, may need to discuss this then since multiple attempts were made to reach him but there was no return call

## 2024-11-06 ENCOUNTER — TELEPHONE (OUTPATIENT)
Age: 49
End: 2024-11-06

## 2024-11-06 ENCOUNTER — OFFICE VISIT (OUTPATIENT)
Dept: FAMILY MEDICINE CLINIC | Facility: CLINIC | Age: 49
End: 2024-11-06
Payer: COMMERCIAL

## 2024-11-06 ENCOUNTER — TREATMENT (OUTPATIENT)
Dept: GASTROENTEROLOGY | Facility: CLINIC | Age: 49
End: 2024-11-06

## 2024-11-06 VITALS
SYSTOLIC BLOOD PRESSURE: 126 MMHG | TEMPERATURE: 97.4 F | DIASTOLIC BLOOD PRESSURE: 82 MMHG | WEIGHT: 165.2 LBS | BODY MASS INDEX: 23.65 KG/M2 | HEIGHT: 70 IN | HEART RATE: 73 BPM

## 2024-11-06 DIAGNOSIS — F33.2 SEVERE EPISODE OF RECURRENT MAJOR DEPRESSIVE DISORDER, WITHOUT PSYCHOTIC FEATURES (HCC): Primary | ICD-10-CM

## 2024-11-06 DIAGNOSIS — R10.13 EPIGASTRIC PAIN: ICD-10-CM

## 2024-11-06 DIAGNOSIS — R63.4 WEIGHT LOSS, ABNORMAL: ICD-10-CM

## 2024-11-06 DIAGNOSIS — K64.9 HEMORRHOIDS, UNSPECIFIED HEMORRHOID TYPE: ICD-10-CM

## 2024-11-06 DIAGNOSIS — K64.0 GRADE I HEMORRHOIDS: Primary | ICD-10-CM

## 2024-11-06 PROCEDURE — 99214 OFFICE O/P EST MOD 30 MIN: CPT | Performed by: FAMILY MEDICINE

## 2024-11-06 NOTE — TELEPHONE ENCOUNTER
Received [employer] referral  and a routine referral to schedule talk therapy with William Baer. LVM for patient to call back regarding this referral. Please schedule with William Baer for TT if no availability please add to high priority waitlist.

## 2024-11-06 NOTE — PROGRESS NOTES
Ambulatory Visit  Name: El Marti      : 1975      MRN: [id number]  Encounter Provider: Jeremy Acharya MD  Encounter Date: 2024   Encounter department: [facility]    Assessment & Plan  Severe episode of recurrent major depressive disorder, without psychotic features (HCC)  I reviewed with pt. We discussed treatment options.  Will refer to [facility]. I reached out to provider and discussed symptoms/hx to make sure he has counselor who has experience treating his issues. Recheck 3m - earlier if worse    Orders:    Ambulatory referral to Psych Services; Future    Weight loss, abnormal  Has stabilized. Labs were unremarkable. Continue present care. Recheck 3m  Orders:    Ambulatory Referral to Gastroenterology; Future    Epigastric pain  Unchanged. Work up negative so far. No change with PPI.  Refer to GI - eval for possible EGD  Orders:    Ambulatory Referral to Gastroenterology; Future    Hemorrhoids, unspecified hemorrhoid type  Synptomatic.  Refer to GI for eval and treatment  Orders:    Ambulatory Referral to Gastroenterology; Future         History of Present Illness     f/u multiple med issues  - pt states that he is still having abd discomfort, but weight is stable. We discussed labs and UGI resutlts - all normal. Also reviewed previous colonoscopy results. Pt notes that he has been experiencing a large amount of emotional issues and wonders if that could be playing a role. No change in Bms  - mood is very labile. Pt has multiple stressors and prior emotional traumas. He thinks that this may be playing a role with symptoms. He is reluctant to take medications but would be interested in talking with a counselor. PHQ2 done at last visit = 6 and PHQ9 = 20.   - I reviewed labs results and study results with pt      Review of Systems   Constitutional:  Positive for fatigue. Negative for activity change, appetite change and unexpected weight change  (sl improved since last visit).   HENT: Negative.     Eyes: Negative.    Respiratory: Negative.     Cardiovascular: Negative.    Gastrointestinal:  Positive for abdominal pain. Negative for abdominal distention, constipation, diarrhea, nausea and vomiting.        Epigastric - persistent   Genitourinary: Negative.    Musculoskeletal: Negative.    Skin: Negative.    Neurological: Negative.    Psychiatric/Behavioral:  Positive for dysphoric mood and sleep disturbance. Negative for self-injury and suicidal ideas. The patient is nervous/anxious.      Past Medical History:   Diagnosis Date    Hypertension      Past Surgical History:   Procedure Laterality Date    INGUINAL HERNIA REPAIR  1995    Prp I/corey init reduc >5 yr     MOUTH SURGERY      VASECTOMY       Family History   Problem Relation Age of Onset    Hypertension Mother     Arrhythmia Mother     Atrial fibrillation Mother     Heart murmur Mother     COPD Mother     Cancer Father     Heart disease Father     Hypertension Father     Stroke Father     Hyperlipidemia Neg Hx     Heart failure Neg Hx     Heart attack Neg Hx     Fainting Neg Hx     Asthma Neg Hx     Clotting disorder Neg Hx     Anemia Neg Hx      Social History     Tobacco Use    Smoking status: Former     Types: Cigarettes    Smokeless tobacco: Never   Vaping Use    Vaping status: Never Used   Substance and Sexual Activity    Alcohol use: Yes     Comment: social, very rare    Drug use: No    Sexual activity: Yes     Current Outpatient Medications on File Prior to Visit   Medication Sig    omeprazole (PriLOSEC) 40 MG capsule Take 1 capsule (40 mg total) by mouth daily (Patient not taking: Reported on 11/6/2024)     Allergies   Allergen Reactions    Citalopram Chest Pain, Dizziness and Other (See Comments)    Penicillins        There is no immunization history on file for this patient.  Objective     /82 (BP Location: Left arm, Patient Position: Sitting, Cuff Size: Adult)   Pulse 73   Temp (!)  "97.4 °F (36.3 °C)   Ht 5' 10\" (1.778 m)   Wt 74.9 kg (165 lb 3.2 oz)   BMI 23.70 kg/m²     Physical Exam  Vitals reviewed.   HENT:      Head: Normocephalic.      Mouth/Throat:      Mouth: Mucous membranes are moist.   Eyes:      General: No scleral icterus.     Extraocular Movements: Extraocular movements intact.      Conjunctiva/sclera: Conjunctivae normal.      Pupils: Pupils are equal, round, and reactive to light.   Cardiovascular:      Rate and Rhythm: Normal rate and regular rhythm.   Pulmonary:      Effort: Pulmonary effort is normal.      Breath sounds: Normal breath sounds.   Abdominal:      General: There is no distension.      Palpations: There is no mass.      Tenderness: There is no abdominal tenderness.   Musculoskeletal:         General: Normal range of motion.      Right lower leg: No edema.      Left lower leg: No edema.   Lymphadenopathy:      Cervical: No cervical adenopathy.   Skin:     General: Skin is warm.      Capillary Refill: Capillary refill takes less than 2 seconds.   Neurological:      General: No focal deficit present.      Mental Status: He is alert and oriented to person, place, and time.   Psychiatric:         Behavior: Behavior normal.         Thought Content: Thought content normal.         Judgment: Judgment normal.         "

## 2024-11-08 ENCOUNTER — TELEPHONE (OUTPATIENT)
Age: 49
End: 2024-11-08

## 2024-11-08 ENCOUNTER — TELEPHONE (OUTPATIENT)
Dept: FAMILY MEDICINE CLINIC | Facility: CLINIC | Age: 49
End: 2024-11-08

## 2024-11-08 RX ORDER — HYDROCORTISONE ACETATE 25 MG/1
SUPPOSITORY RECTAL
COMMUNITY
Start: 2024-10-26

## 2024-11-08 NOTE — TELEPHONE ENCOUNTER
----- Message from Jeremy Acharya MD sent at 11/8/2024  7:20 AM EST -----  Please call pt - behavioral health is trying to reach patient to set up appt. Call / discuss with pt only

## 2024-11-08 NOTE — ASSESSMENT & PLAN NOTE
I reviewed with pt. We discussed treatment options.  Will refer to [facility]. I reached out to provider and discussed symptoms/hx to make sure he has counselor who has experience treating his issues. Recheck 3m - earlier if worse    Orders:    Ambulatory referral to Psych Services; Future

## 2024-11-08 NOTE — TELEPHONE ENCOUNTER
----- Message from William ALANA sent at 11/8/2024  8:09 AM EST -----  EVELYN  ----- Message -----  From: SHANTEL Green  Sent: 11/8/2024   7:40 AM EST  To: William Cordova    Absolutely, I'll take him  ----- Message -----  From: William PEARSON Tasha  Sent: 11/6/2024  12:29 PM EST  To: SHANTEL Green    Let me know what you think. William  ----- Message -----  From: Marilyn Tasha  Sent: 11/6/2024  12:06 PM EST  To: Lovely Cervantes; Andrei Willingham; #    Kalia Baer  ----- Message -----  From: William Vegabry  Sent: 11/6/2024  11:47 AM EST  To: Lovely Cervantes; Marilyn Cordova; #    Dr. Escobar from an integrated practice and is a regional med director. William  ----- Message -----  From: Jeremy Acharya MD  Sent: 11/6/2024  11:33 AM EST  To: William LILI Tasha    Jose De Jesus Thomas    I was hoping you could help me with this gentleman.  He has a history of multiple emotional traumas (childhood, , present relationship) and is suffering from severe depression.  No suicidal ideation.  Would truly benefit from some counseling but would would like to make sure that we get him someone who has experience in these areas.  Do you have anyone you could suggest.  I did put in a general referral to behavioral therapy at [facility].  Hope all is well-thanks    Shawn

## 2024-11-08 NOTE — TELEPHONE ENCOUNTER
Per [employer] message, writer contacted patient in regards to scheduling with Kalia Baer at our [place] office.   Patients wife, whom has consent to speak to us, stated that at this time they have found services elsewhere.  Writer stated we would remove him from our [facility] at this time and patients wife verbalized understanding.

## 2024-11-13 ENCOUNTER — OFFICE VISIT (OUTPATIENT)
Age: 49
End: 2024-11-13
Payer: COMMERCIAL

## 2024-11-13 VITALS
HEIGHT: 70 IN | SYSTOLIC BLOOD PRESSURE: 156 MMHG | WEIGHT: 165 LBS | HEART RATE: 86 BPM | DIASTOLIC BLOOD PRESSURE: 97 MMHG | BODY MASS INDEX: 23.62 KG/M2 | OXYGEN SATURATION: 97 %

## 2024-11-13 DIAGNOSIS — K60.2 ANAL FISSURE: Primary | ICD-10-CM

## 2024-11-13 DIAGNOSIS — K60.0 ACUTE POSTERIOR ANAL FISSURE: ICD-10-CM

## 2024-11-13 DIAGNOSIS — K64.0 GRADE I HEMORRHOIDS: ICD-10-CM

## 2024-11-13 DIAGNOSIS — K62.89 ANAL PAIN: ICD-10-CM

## 2024-11-13 DIAGNOSIS — I10 ESSENTIAL HYPERTENSION: ICD-10-CM

## 2024-11-13 DIAGNOSIS — K64.4 EXTERNAL HEMORRHOID: Primary | ICD-10-CM

## 2024-11-13 PROCEDURE — 99203 OFFICE O/P NEW LOW 30 MIN: CPT | Performed by: COLON & RECTAL SURGERY

## 2024-11-13 RX ORDER — KETOROLAC TROMETHAMINE 10 MG/1
10 TABLET, FILM COATED ORAL EVERY 6 HOURS PRN
Qty: 20 TABLET | Refills: 1 | Status: SHIPPED | OUTPATIENT
Start: 2024-11-13

## 2024-11-13 NOTE — PROGRESS NOTES
Name: El Marti      : 1975      MRN: [id number]  Encounter Provider: Orion Alas MD  Encounter Date: 2024   Encounter department: [facility] [place]  :  Assessment & Plan  Acute posterior anal fissure  Patient advised in conservative therapy for anal fissure to include high-fiber diet Citrucel 2 tablets p.o. 4 times daily    Patient advised to begin taking Tylenol 500 mg 2 tablets every 6 hours as needed pain    Sitz bath's as needed pain patient advised to begin using Cardizem ointment 2% to the anal canal to facilitate healing of the anal fissure.    Patient advised to take Toradol 10 mg every 6 as needed for additional pain relief if Tylenol not adequate    Patient will follow-up in the office in 1 month's time for recheck patient advised if conservative measures fail surgery may be indicated       Anal pain  See above       Grade I hemorrhoids  Anoscopic exam not completed due to patient's anal discomfort  Orders:    Ambulatory Referral to Colorectal Surgery    External hemorrhoid         Essential hypertension             History of Present Illness   HPI  El Marti is a 49 y.o. male who presents patient with rectal bleeding anal discomfort with defecation.  The patient is with longstanding history of hemorrhoids with prolapse, manual reduction.  The patient complains today of a different type of pain with burning and severe spasm of the anal canal  History obtained from: patient  Review of Systems   Constitutional:  Negative for chills and fever.   HENT:  Negative for ear pain and sore throat.    Eyes:  Negative for pain and visual disturbance.   Respiratory:  Negative for cough and shortness of breath.    Cardiovascular:  Negative for chest pain and palpitations.   Gastrointestinal:  Positive for anal bleeding. Negative for abdominal pain and vomiting.        Anal pain   Genitourinary:  Negative for dysuria and hematuria.   Musculoskeletal:   "Negative for arthralgias and back pain.   Skin:  Negative for color change and rash.   Neurological:  Negative for seizures and syncope.   All other systems reviewed and are negative.    Past Medical History   Past Medical History:   Diagnosis Date    Hypertension      Past Surgical History:   Procedure Laterality Date    INGUINAL HERNIA REPAIR  1995    Prp I/corey init reduc >5 yr     MOUTH SURGERY      VASECTOMY       Family History   Problem Relation Age of Onset    Hypertension Mother     Arrhythmia Mother     Atrial fibrillation Mother     Heart murmur Mother     COPD Mother     Cancer Father     Heart disease Father     Hypertension Father     Stroke Father     Hyperlipidemia Neg Hx     Heart failure Neg Hx     Heart attack Neg Hx     Fainting Neg Hx     Asthma Neg Hx     Clotting disorder Neg Hx     Anemia Neg Hx       reports that he has quit smoking. His smoking use included cigarettes. He has never used smokeless tobacco. He reports current alcohol use. He reports that he does not use drugs.  Current Outpatient Medications on File Prior to Visit   Medication Sig Dispense Refill    hydrocortisone (ANUSOL-HC) 25 mg suppository INSERT 1 SUPPOSITORY (25 MG TOTAL) INTO THE RECTUM DAILY AT BEDTIME      omeprazole (PriLOSEC) 40 MG capsule Take 1 capsule (40 mg total) by mouth daily (Patient not taking: Reported on 11/6/2024) 30 capsule 1     No current facility-administered medications on file prior to visit.     Allergies   Allergen Reactions    Citalopram Chest Pain, Dizziness and Other (See Comments)    Penicillins       Medical History Reviewed by provider this encounter:     .     Objective   /97   Pulse 86   Ht 5' 10\" (1.778 m)   Wt 74.8 kg (165 lb)   SpO2 97%   BMI 23.68 kg/m²      Physical Exam  Vitals and nursing note reviewed.   Constitutional:       General: He is not in acute distress.     Appearance: He is well-developed.   HENT:      Head: Normocephalic and atraumatic.   Eyes:      " Conjunctiva/sclera: Conjunctivae normal.   Cardiovascular:      Rate and Rhythm: Normal rate and regular rhythm.      Heart sounds: No murmur heard.  Pulmonary:      Effort: Pulmonary effort is normal. No respiratory distress.      Breath sounds: Normal breath sounds.   Abdominal:      Palpations: Abdomen is soft.      Tenderness: There is no abdominal tenderness.   Genitourinary:     Comments: Inspection of the anal margin reveals a external hemorrhoid left lateral position asymptomatic currently    Digital rectal exam reveals marked spasm of the anal canal and pain posterior midline consistent with an acute anal fissure  Musculoskeletal:         General: No swelling.      Cervical back: Neck supple.   Skin:     General: Skin is warm and dry.      Capillary Refill: Capillary refill takes less than 2 seconds.   Neurological:      Mental Status: He is alert.   Psychiatric:         Mood and Affect: Mood normal.

## 2024-12-17 ENCOUNTER — OFFICE VISIT (OUTPATIENT)
Age: 49
End: 2024-12-17
Payer: COMMERCIAL

## 2024-12-17 VITALS
OXYGEN SATURATION: 99 % | HEART RATE: 82 BPM | HEIGHT: 70 IN | DIASTOLIC BLOOD PRESSURE: 79 MMHG | WEIGHT: 165 LBS | BODY MASS INDEX: 23.62 KG/M2 | SYSTOLIC BLOOD PRESSURE: 138 MMHG

## 2024-12-17 DIAGNOSIS — K60.0 ACUTE POSTERIOR ANAL FISSURE: Primary | ICD-10-CM

## 2024-12-17 DIAGNOSIS — K62.89 ANAL PAIN: ICD-10-CM

## 2024-12-17 PROCEDURE — 99213 OFFICE O/P EST LOW 20 MIN: CPT | Performed by: COLON & RECTAL SURGERY

## 2024-12-17 NOTE — PROGRESS NOTES
"Name: El Marti      : 1975      MRN: [id number]  Encounter Provider: Orion Alas MD  Encounter Date: 2024   Encounter department: [facility] [place]  :  Assessment & Plan  Acute posterior anal fissure  Resolved    Patient advised to maintain high-fiber diet as previously outlined.  Patient will follow-up as needed       Anal pain  Resolved           History of Present Illness   HPI  El Marti is a 49 y.o. male who presents for evaluation of response to therapy for chronic anal fissure.  The patient states his anal pain is resolved his bowel function has improved with high-fiber diet.  History obtained from: patient    Review of Systems   Constitutional:  Negative for chills and fever.   HENT:  Negative for ear pain and sore throat.    Eyes:  Negative for pain and visual disturbance.   Respiratory:  Negative for cough and shortness of breath.    Cardiovascular:  Negative for chest pain and palpitations.   Gastrointestinal:  Negative for abdominal pain and vomiting.   Genitourinary:  Negative for dysuria and hematuria.   Musculoskeletal:  Negative for arthralgias and back pain.   Skin:  Negative for color change and rash.   Neurological:  Negative for seizures and syncope.   All other systems reviewed and are negative.    Medical History Reviewed by provider this encounter:  Tobacco  Allergies  Meds  Problems  Med Hx  Surg Hx  Fam Hx     .     Objective   /79   Pulse 82   Ht 5' 10\" (1.778 m)   Wt 74.8 kg (165 lb)   SpO2 99%   BMI 23.68 kg/m²      Physical Exam  Constitutional:       Appearance: Normal appearance.   Skin:     General: Skin is warm and dry.   Neurological:      Mental Status: He is alert and oriented to person, place, and time.   Psychiatric:         Behavior: Behavior normal.       Anal rectal exam inspection of the anal margin reveals a symptomatic right lateral hemorrhoid.  Section of the anal canal revealed healed " anal fissure

## 2025-07-15 ENCOUNTER — APPOINTMENT (OUTPATIENT)
Dept: RADIOLOGY | Facility: MEDICAL CENTER | Age: 50
End: 2025-07-15
Payer: COMMERCIAL

## 2025-07-15 DIAGNOSIS — M54.2 NECK PAIN: ICD-10-CM

## 2025-07-15 DIAGNOSIS — R63.4 WEIGHT LOSS, ABNORMAL: ICD-10-CM

## 2025-07-15 PROCEDURE — 72050 X-RAY EXAM NECK SPINE 4/5VWS: CPT

## 2025-07-15 PROCEDURE — 71046 X-RAY EXAM CHEST 2 VIEWS: CPT

## 2025-07-25 ENCOUNTER — OFFICE VISIT (OUTPATIENT)
Dept: FAMILY MEDICINE CLINIC | Facility: CLINIC | Age: 50
End: 2025-07-25
Payer: COMMERCIAL

## 2025-07-25 ENCOUNTER — APPOINTMENT (OUTPATIENT)
Dept: RADIOLOGY | Facility: MEDICAL CENTER | Age: 50
End: 2025-07-25
Payer: COMMERCIAL

## 2025-07-25 VITALS
OXYGEN SATURATION: 98 % | HEIGHT: 70 IN | SYSTOLIC BLOOD PRESSURE: 130 MMHG | BODY MASS INDEX: 24.51 KG/M2 | DIASTOLIC BLOOD PRESSURE: 72 MMHG | TEMPERATURE: 98 F | HEART RATE: 83 BPM | WEIGHT: 171.2 LBS

## 2025-07-25 DIAGNOSIS — M54.50 CHRONIC LEFT-SIDED LOW BACK PAIN WITHOUT SCIATICA: Primary | ICD-10-CM

## 2025-07-25 DIAGNOSIS — G89.29 CHRONIC LEFT-SIDED LOW BACK PAIN WITHOUT SCIATICA: ICD-10-CM

## 2025-07-25 DIAGNOSIS — M54.50 CHRONIC LEFT-SIDED LOW BACK PAIN WITHOUT SCIATICA: ICD-10-CM

## 2025-07-25 DIAGNOSIS — M25.552 LEFT HIP PAIN: ICD-10-CM

## 2025-07-25 DIAGNOSIS — M54.2 CERVICALGIA: ICD-10-CM

## 2025-07-25 DIAGNOSIS — G89.29 CHRONIC LEFT-SIDED LOW BACK PAIN WITHOUT SCIATICA: Primary | ICD-10-CM

## 2025-07-25 PROCEDURE — 73502 X-RAY EXAM HIP UNI 2-3 VIEWS: CPT

## 2025-07-25 PROCEDURE — 72110 X-RAY EXAM L-2 SPINE 4/>VWS: CPT

## 2025-07-25 PROCEDURE — 99214 OFFICE O/P EST MOD 30 MIN: CPT | Performed by: FAMILY MEDICINE

## 2025-07-25 RX ORDER — METHOCARBAMOL 500 MG/1
500 TABLET, FILM COATED ORAL 4 TIMES DAILY PRN
Qty: 60 TABLET | Refills: 1 | Status: SHIPPED | OUTPATIENT
Start: 2025-07-25

## 2025-07-25 RX ORDER — MELOXICAM 15 MG/1
15 TABLET ORAL DAILY PRN
Qty: 30 TABLET | Refills: 3 | Status: SHIPPED | OUTPATIENT
Start: 2025-07-25 | End: 2025-07-25 | Stop reason: SDUPTHER

## 2025-07-25 RX ORDER — MELOXICAM 15 MG/1
15 TABLET ORAL DAILY PRN
Qty: 30 TABLET | Refills: 3 | Status: SHIPPED | OUTPATIENT
Start: 2025-07-25

## 2025-07-25 RX ORDER — METHOCARBAMOL 500 MG/1
500 TABLET, FILM COATED ORAL 4 TIMES DAILY PRN
Qty: 60 TABLET | Refills: 1 | Status: SHIPPED | OUTPATIENT
Start: 2025-07-25 | End: 2025-07-25 | Stop reason: SDUPTHER